# Patient Record
(demographics unavailable — no encounter records)

---

## 2024-11-20 NOTE — ASSESSMENT
[FreeTextEntry1] : Follow-up appointment in 1 year with aortic CTA.  I spent 35 minutes reviewing patient's CAT scan and with the patient.

## 2024-11-20 NOTE — HISTORY OF PRESENT ILLNESS
[FreeTextEntry1] : Patient is status post endovascular repair of the abdominal aortic aneurysm.  Recent aortic CTA showed no evidence of endoleak.  Patient denies any pain.

## 2024-11-20 NOTE — PHYSICAL EXAM
[JVD] : no jugular venous distention  [Carotid Bruits] : no carotid bruits [Normal Breath Sounds] : Normal breath sounds [Normal Heart Sounds] : normal heart sounds [2+] : left 2+ [1+] : left 1+ [Ankle Swelling (On Exam)] : not present [Varicose Veins Of Lower Extremities] : not present [] : not present [Abdomen Masses] : No abdominal masses [Abdomen Tenderness] : ~T ~M No abdominal tenderness [No HSM] : no hepatosplenomegaly [No Rash or Lesion] : No rash or lesion [Alert] : alert [Calm] : calm [de-identified] : No distress

## 2024-12-18 NOTE — PHYSICAL EXAM
[Well Developed] : well developed [Well Nourished] : well nourished [No Acute Distress] : no acute distress [Obese] : obese [Normal Conjunctiva] : normal conjunctiva [Normal Venous Pressure] : normal venous pressure [No Carotid Bruit] : no carotid bruit [Normal S1, S2] : normal S1, S2 [No Murmur] : no murmur [No Rub] : no rub [No Gallop] : no gallop [Clear Lung Fields] : clear lung fields [Good Air Entry] : good air entry [No Respiratory Distress] : no respiratory distress  [Soft] : abdomen soft [Non Tender] : non-tender [No Masses/organomegaly] : no masses/organomegaly [Normal Bowel Sounds] : normal bowel sounds [Normal Gait] : normal gait [No Edema] : no edema [No Cyanosis] : no cyanosis [No Clubbing] : no clubbing [No Varicosities] : no varicosities [No Rash] : no rash [No Skin Lesions] : no skin lesions [Moves all extremities] : moves all extremities [No Focal Deficits] : no focal deficits [Normal Speech] : normal speech [Alert and Oriented] : alert and oriented [Normal memory] : normal memory [General Appearance - In No Acute Distress] : no acute distress [Normal Oral Mucosa] : normal oral mucosa [No Oral Pallor] : no oral pallor [No Oral Cyanosis] : no oral cyanosis [Normal Jugular Venous A Waves Present] : normal jugular venous A waves present [Normal Jugular Venous V Waves Present] : normal jugular venous V waves present [No Jugular Venous Loco A Waves] : no jugular venous loco A waves [Respiration, Rhythm And Depth] : normal respiratory rhythm and effort [Exaggerated Use Of Accessory Muscles For Inspiration] : no accessory muscle use [Auscultation Breath Sounds / Voice Sounds] : lungs were clear to auscultation bilaterally [Heart Rate And Rhythm] : heart rate and rhythm were normal [Heart Sounds] : normal S1 and S2 [Murmurs] : no murmurs present [Abdomen Soft] : soft [Abdomen Tenderness] : non-tender [Abdomen Mass (___ Cm)] : no abdominal mass palpated [Abnormal Walk] : normal gait [Gait - Sufficient For Exercise Testing] : the gait was sufficient for exercise testing [Nail Clubbing] : no clubbing of the fingernails [Cyanosis, Localized] : no localized cyanosis [Petechial Hemorrhages (___cm)] : no petechial hemorrhages [Skin Color & Pigmentation] : normal skin color and pigmentation [] : no rash [No Venous Stasis] : no venous stasis [Skin Lesions] : no skin lesions [No Skin Ulcers] : no skin ulcer [No Xanthoma] : no  xanthoma was observed [Oriented To Time, Place, And Person] : oriented to person, place, and time [Affect] : the affect was normal [Mood] : the mood was normal [No Anxiety] : not feeling anxious [de-identified] : Limited exercise capacity with chronic back pain [FreeTextEntry1] : obese

## 2024-12-18 NOTE — ASSESSMENT
[FreeTextEntry1] : Gato has the medical history detailed above and active medical issues including:  - No anginal symptoms, small fixed defect without ischemia.  Continue optimized medical management for CAD.   - CAD 2vCABG with LIMA 2004 stable on ASA  - Hypertension resting home BPs at guideline goal on Metoprolol and Ramipril  - Abdominal aortic aneurysm surgery Dr. Wilson 10/26/2023 [facility].   - Dyslipidemia intolerant statins with elevated LFT elevation, myalgia and joint pain. Patient on  Zetia every other day.  Patient declined starting Repatha and enrollment in the lipid study ESC. Hyperglyceridemia on fenofibrate 48 mg daily with repeat labs 3 months.  Triglyceride remains above guideline goal reminded patient to reduce cheese and eggs in his diet.  - History of  arthritis  - Sciatica severe back pain limiting walking ability  -  Occupation   Advised patient to follow active lifestyle with regular cardiovascular exercise. Patient educated on lifestyle and diet modification with low sodium low fat diet and avoidance of excessive alcohol. Patient is aware to call with any symptoms or concerns.    Patient will be seen in cardiology follow-up 6 months with echocardiogram.  Current cardiac medications remain unchanged and renewals  are up to date. Repeat labs ordered.  Gato will follow up with Dr. Humberto Lozano for primary care.  Total time spent 45 minutes, reviewing of test results, chart information, patient discussion, physical exam and completion of chart documentation.

## 2024-12-18 NOTE — REVIEW OF SYSTEMS
[Joint Pain] : joint pain [Negative] : Heme/Lymph [FreeTextEntry9] : Spinal stenosis chronic back pain, limited exercise

## 2024-12-18 NOTE — REASON FOR VISIT
[Other: ____] : [unfilled] [Follow-Up - Clinic] : a clinic follow-up of [Hyperlipidemia] : hyperlipidemia [Hypertension] : hypertension [Peripheral Vascular Disease] : peripheral vascular disease [FreeTextEntry1] : Gato has a past medical history of hypertension, dyslipidemia, intolerance to statin therapy with LFT elevation, preDM, obesity, arthritis, erectile dysfunction, coronary artery disease, two-vessel CABG with LIMA in 2004, spinal stenosis epidural pain management, limited exercise, AAA repair Oct 2023.  Cardiovascular review of symptoms is negative for exertional chest pain, dyspnea, palpitations dizziness or syncope. No PND or orthopnea leg edema. No bleeding or black stool.    Patient is exercising in the gym 25 minutes treadmill without exertional symptoms.  Patient has right knee pain, locked up and, patient has siatica with severe back pain epidural pain management with Dr. Paniagua. Patient works as a  in the summer and spends harkins in [place].   Gato has been intolerant to statin therapy in the past with LFT elevation. Patient not a candidate for the ESC lipid study. Fasting lipid labs are borderline on Zetia. I have reminded him to avoid alcohol. He is following a low-sodium, low-fat, low carbohydrate diet.   Echocardiogram June 2023 LVEF 55 to 60%, mild MR, AS and TR  Labs June 2022 normal CBC, BMP, LFT, TSH, total cholesterol 189, triglyceride 162, HDL 51, , HbA1c 6.7  Lexiscan Myoview stress test Nov 2021 LVEF 66%, small inferolateral ischemic defect, nonischemic EKG response, no chest pain, baseline sinus rhythm, I RBBB, PRWP versus ASW MI  CT abdominal aorta 11/14/2020 stable infrarenal abdominal aortic aneurysm with peripheral mural thrombus measuring 4.4 x 4.3 cm.  Patient following up with vascular surgery Dr. Wilson  Echocardiogram July 2020, LVEF 55%, mild diastolic dysfunction, mild MR and TR, normal RVSP  Patient is following up with  for abdominal aortic aneurysm CAT scan is pending. Abdominal CTA 5/25/19,  AAA 3.8cm  Labs 9/11/19 normal hemoglobin, platelet 104, normal BMP, fasting glucose 151, ALT 63, AST 57, HbA1c 7.0.  Patient will follow up with PCP Dr Curtis for diabetes.   Labs October 2018 total cholesterol 216, triglyceride 306, HDL 46, , normal CBC, BMP, LFT 44. Suggested patient followup with endocrinologist for HbA1c 6.7, patient declined  Contrast CTA of the neck October 2017, no significant stenosis bilaterally, no abnormality seen right carotid artery, study done for linear structure seen on carotid Doppler.  Carotid Doppler October 2017, nonobstructive mild heterogeneous plaque, possible linear structure RCCA  Abdominal ultrasound October 2017, mid abdomen aorta 3.3cm, prior study 3.2cm  2-D echo October 2017, LVEF 55% mild diastolic dysfunction, mild MR TR, PSP 33 mmHg  Exercise Myoview stress test July 2017, LVEF 64% small thick basal inferolateral defect with mild hypokinesis basal inferolateral, critical EKG response, no anginal symptoms, 89% MPHR, 6 minutes 30 seconds David protocol  EKG  5/28/15, SR, IRBBB, NSST  2-D echo October 2016 LVEF 55-60%, aortic root 4.2 cm, mild diastolic dysfunction, mild MR  Carotid ultrasound October 2016 mild nonobstructive plaque  Abdominal ultrasound October 2060 distal aorta 3.2 cm. Followup Dr. Wilson with CTA abdomen  [FreeTextEntry2] : nonionvasive testing for CAD,  2vCABG LIMA 2004, HTN, dyslipidemia intolerant to statins, preDM, obesity

## 2024-12-18 NOTE — PHYSICAL EXAM
[Well Developed] : well developed [Well Nourished] : well nourished [No Acute Distress] : no acute distress [Obese] : obese [Normal Conjunctiva] : normal conjunctiva [Normal Venous Pressure] : normal venous pressure [No Carotid Bruit] : no carotid bruit [Normal S1, S2] : normal S1, S2 [No Murmur] : no murmur [No Rub] : no rub [No Gallop] : no gallop [Clear Lung Fields] : clear lung fields [Good Air Entry] : good air entry [No Respiratory Distress] : no respiratory distress  [Soft] : abdomen soft [Non Tender] : non-tender [No Masses/organomegaly] : no masses/organomegaly [Normal Bowel Sounds] : normal bowel sounds [Normal Gait] : normal gait [No Edema] : no edema [No Cyanosis] : no cyanosis [No Clubbing] : no clubbing [No Varicosities] : no varicosities [No Rash] : no rash [No Skin Lesions] : no skin lesions [Moves all extremities] : moves all extremities [No Focal Deficits] : no focal deficits [Normal Speech] : normal speech [Alert and Oriented] : alert and oriented [Normal memory] : normal memory [General Appearance - In No Acute Distress] : no acute distress [Normal Oral Mucosa] : normal oral mucosa [No Oral Pallor] : no oral pallor [No Oral Cyanosis] : no oral cyanosis [Normal Jugular Venous A Waves Present] : normal jugular venous A waves present [Normal Jugular Venous V Waves Present] : normal jugular venous V waves present [No Jugular Venous Loco A Waves] : no jugular venous loco A waves [Respiration, Rhythm And Depth] : normal respiratory rhythm and effort [Exaggerated Use Of Accessory Muscles For Inspiration] : no accessory muscle use [Auscultation Breath Sounds / Voice Sounds] : lungs were clear to auscultation bilaterally [Heart Rate And Rhythm] : heart rate and rhythm were normal [Heart Sounds] : normal S1 and S2 [Murmurs] : no murmurs present [Abdomen Soft] : soft [Abdomen Tenderness] : non-tender [Abdomen Mass (___ Cm)] : no abdominal mass palpated [Abnormal Walk] : normal gait [Gait - Sufficient For Exercise Testing] : the gait was sufficient for exercise testing [Nail Clubbing] : no clubbing of the fingernails [Cyanosis, Localized] : no localized cyanosis [Petechial Hemorrhages (___cm)] : no petechial hemorrhages [Skin Color & Pigmentation] : normal skin color and pigmentation [] : no rash [No Venous Stasis] : no venous stasis [Skin Lesions] : no skin lesions [No Skin Ulcers] : no skin ulcer [No Xanthoma] : no  xanthoma was observed [Oriented To Time, Place, And Person] : oriented to person, place, and time [Affect] : the affect was normal [Mood] : the mood was normal [No Anxiety] : not feeling anxious [de-identified] : Limited exercise capacity with chronic back pain [FreeTextEntry1] : obese

## 2025-05-28 NOTE — ASSESSMENT
[FreeTextEntry1] : Gato has the medical history detailed above and active medical issues including:  - No anginal symptoms, small fixed defect without ischemia.  Continue optimized medical management for CAD.   - CAD 2vCABG with LIMA 2004 stable on ASA  - Hypertension resting home BPs at guideline goal on Metoprolol and Ramipril  - Abdominal aortic aneurysm surgery Dr. Wilson 10/26/2023 [facility].   - Dyslipidemia intolerant statins with elevated LFT elevation, myalgia and joint pain. Patient on  Zetia every other day.  Patient declined starting Repatha and enrollment in the lipid study ESC. Hyperglyceridemia on fenofibrate 48 mg daily with repeat labs 3 months.  Triglyceride remains above guideline goal reminded patient to reduce cheese and eggs in his diet.  - History of  arthritis  - Sciatica severe back pain limiting walking ability  -  Occupation   Advised patient to follow active lifestyle with regular cardiovascular exercise. Patient educated on lifestyle and diet modification with low sodium low fat diet and avoidance of excessive alcohol. Patient is aware to call with any symptoms or concerns.    Patient will be seen in cardiology follow-up 6 months.  Current cardiac medications remain unchanged and renewals  are up to date. Repeat labs ordered.  Gato will follow up with Dr. Humberto Lozano for primary care.  Total time spent 45 minutes, reviewing of test results, chart information, patient discussion, physical exam and completion of chart documentation.

## 2025-05-28 NOTE — PHYSICAL EXAM
[Well Developed] : well developed [Well Nourished] : well nourished [No Acute Distress] : no acute distress [Obese] : obese [Normal Conjunctiva] : normal conjunctiva [Normal Venous Pressure] : normal venous pressure [No Carotid Bruit] : no carotid bruit [Normal S1, S2] : normal S1, S2 [No Murmur] : no murmur [No Rub] : no rub [No Gallop] : no gallop [Clear Lung Fields] : clear lung fields [Good Air Entry] : good air entry [No Respiratory Distress] : no respiratory distress  [Soft] : abdomen soft [Non Tender] : non-tender [No Masses/organomegaly] : no masses/organomegaly [Normal Bowel Sounds] : normal bowel sounds [Normal Gait] : normal gait [No Edema] : no edema [No Cyanosis] : no cyanosis [No Clubbing] : no clubbing [No Varicosities] : no varicosities [No Rash] : no rash [No Skin Lesions] : no skin lesions [Moves all extremities] : moves all extremities [No Focal Deficits] : no focal deficits [Normal Speech] : normal speech [Alert and Oriented] : alert and oriented [Normal memory] : normal memory [General Appearance - In No Acute Distress] : no acute distress [Normal Oral Mucosa] : normal oral mucosa [No Oral Pallor] : no oral pallor [No Oral Cyanosis] : no oral cyanosis [Normal Jugular Venous A Waves Present] : normal jugular venous A waves present [Normal Jugular Venous V Waves Present] : normal jugular venous V waves present [No Jugular Venous Loco A Waves] : no jugular venous loco A waves [Respiration, Rhythm And Depth] : normal respiratory rhythm and effort [Exaggerated Use Of Accessory Muscles For Inspiration] : no accessory muscle use [Auscultation Breath Sounds / Voice Sounds] : lungs were clear to auscultation bilaterally [Heart Rate And Rhythm] : heart rate and rhythm were normal [Heart Sounds] : normal S1 and S2 [Murmurs] : no murmurs present [Abdomen Soft] : soft [Abdomen Tenderness] : non-tender [Abdomen Mass (___ Cm)] : no abdominal mass palpated [Abnormal Walk] : normal gait [Gait - Sufficient For Exercise Testing] : the gait was sufficient for exercise testing [Nail Clubbing] : no clubbing of the fingernails [Cyanosis, Localized] : no localized cyanosis [Petechial Hemorrhages (___cm)] : no petechial hemorrhages [Skin Color & Pigmentation] : normal skin color and pigmentation [] : no rash [No Venous Stasis] : no venous stasis [Skin Lesions] : no skin lesions [No Skin Ulcers] : no skin ulcer [No Xanthoma] : no  xanthoma was observed [Oriented To Time, Place, And Person] : oriented to person, place, and time [Affect] : the affect was normal [Mood] : the mood was normal [No Anxiety] : not feeling anxious [de-identified] : Limited exercise capacity with chronic back pain [FreeTextEntry1] : obese

## 2025-05-28 NOTE — PHYSICAL EXAM
[Well Developed] : well developed [Well Nourished] : well nourished [No Acute Distress] : no acute distress [Obese] : obese [Normal Conjunctiva] : normal conjunctiva [Normal Venous Pressure] : normal venous pressure [No Carotid Bruit] : no carotid bruit [Normal S1, S2] : normal S1, S2 [No Murmur] : no murmur [No Rub] : no rub [No Gallop] : no gallop [Clear Lung Fields] : clear lung fields [Good Air Entry] : good air entry [No Respiratory Distress] : no respiratory distress  [Soft] : abdomen soft [Non Tender] : non-tender [No Masses/organomegaly] : no masses/organomegaly [Normal Bowel Sounds] : normal bowel sounds [Normal Gait] : normal gait [No Edema] : no edema [No Cyanosis] : no cyanosis [No Clubbing] : no clubbing [No Varicosities] : no varicosities [No Rash] : no rash [No Skin Lesions] : no skin lesions [Moves all extremities] : moves all extremities [No Focal Deficits] : no focal deficits [Normal Speech] : normal speech [Alert and Oriented] : alert and oriented [Normal memory] : normal memory [General Appearance - In No Acute Distress] : no acute distress [Normal Oral Mucosa] : normal oral mucosa [No Oral Pallor] : no oral pallor [No Oral Cyanosis] : no oral cyanosis [Normal Jugular Venous A Waves Present] : normal jugular venous A waves present [Normal Jugular Venous V Waves Present] : normal jugular venous V waves present [No Jugular Venous Loco A Waves] : no jugular venous loco A waves [Respiration, Rhythm And Depth] : normal respiratory rhythm and effort [Exaggerated Use Of Accessory Muscles For Inspiration] : no accessory muscle use [Auscultation Breath Sounds / Voice Sounds] : lungs were clear to auscultation bilaterally [Heart Rate And Rhythm] : heart rate and rhythm were normal [Heart Sounds] : normal S1 and S2 [Murmurs] : no murmurs present [Abdomen Soft] : soft [Abdomen Tenderness] : non-tender [Abdomen Mass (___ Cm)] : no abdominal mass palpated [Abnormal Walk] : normal gait [Gait - Sufficient For Exercise Testing] : the gait was sufficient for exercise testing [Nail Clubbing] : no clubbing of the fingernails [Cyanosis, Localized] : no localized cyanosis [Petechial Hemorrhages (___cm)] : no petechial hemorrhages [Skin Color & Pigmentation] : normal skin color and pigmentation [] : no rash [No Venous Stasis] : no venous stasis [Skin Lesions] : no skin lesions [No Skin Ulcers] : no skin ulcer [No Xanthoma] : no  xanthoma was observed [Oriented To Time, Place, And Person] : oriented to person, place, and time [Affect] : the affect was normal [Mood] : the mood was normal [No Anxiety] : not feeling anxious [de-identified] : Limited exercise capacity with chronic back pain [FreeTextEntry1] : obese

## 2025-05-28 NOTE — REASON FOR VISIT
[Other: ____] : [unfilled] [Follow-Up - Clinic] : a clinic follow-up of [Hyperlipidemia] : hyperlipidemia [Hypertension] : hypertension [Peripheral Vascular Disease] : peripheral vascular disease [FreeTextEntry1] : Gato has a past medical history of hypertension, dyslipidemia, intolerance to statin therapy with LFT elevation, preDM, obesity, arthritis, erectile dysfunction, coronary artery disease, two-vessel CABG with LIMA in 2004, spinal stenosis epidural pain management, limited exercise, AAA repair Oct 2023.  Cardiovascular review of symptoms is negative for exertional chest pain, dyspnea, palpitations dizziness or syncope. No PND or orthopnea leg edema. No bleeding or black stool.    Patient is exercising in the gym 25 minutes treadmill without exertional symptoms.  Patient has right knee pain, locked up and, patient has siatica with severe back pain epidural pain management with Dr. Paniagua. Patient works as a  in the summer and spends harkins in [place].   Gato has been intolerant to statin therapy in the past with LFT elevation. Patient not a candidate for the ESC lipid study. Fasting lipid labs are borderline on Zetia. I have reminded him to avoid alcohol. He is following a low-sodium, low-fat, low carbohydrate diet  Echocardiogram May 2025 LVEF 55 to 60%, mild-moderate MR, mild TR, normal PASP..   Echocardiogram June 2023 LVEF 55 to 60%, mild MR, AS and TR  Labs June 2022 normal CBC, BMP, LFT, TSH, total cholesterol 189, triglyceride 162, HDL 51, , HbA1c 6.7  Lexiscan Myoview stress test Nov 2021 LVEF 66%, small inferolateral ischemic defect, nonischemic EKG response, no chest pain, baseline sinus rhythm, I RBBB, PRWP versus ASW MI  CT abdominal aorta 11/14/2020 stable infrarenal abdominal aortic aneurysm with peripheral mural thrombus measuring 4.4 x 4.3 cm.  Patient following up with vascular surgery Dr. Wilson  Echocardiogram July 2020, LVEF 55%, mild diastolic dysfunction, mild MR and TR, normal RVSP  Patient is following up with  for abdominal aortic aneurysm CAT scan is pending. Abdominal CTA 5/25/19,  AAA 3.8cm  Labs 9/11/19 normal hemoglobin, platelet 104, normal BMP, fasting glucose 151, ALT 63, AST 57, HbA1c 7.0.  Patient will follow up with PCP Dr Curtis for diabetes.   Labs October 2018 total cholesterol 216, triglyceride 306, HDL 46, , normal CBC, BMP, LFT 44. Suggested patient followup with endocrinologist for HbA1c 6.7, patient declined  Contrast CTA of the neck October 2017, no significant stenosis bilaterally, no abnormality seen right carotid artery, study done for linear structure seen on carotid Doppler.  Carotid Doppler October 2017, nonobstructive mild heterogeneous plaque, possible linear structure RCCA  Abdominal ultrasound October 2017, mid abdomen aorta 3.3cm, prior study 3.2cm  2-D echo October 2017, LVEF 55% mild diastolic dysfunction, mild MR TR, PSP 33 mmHg  Exercise Myoview stress test July 2017, LVEF 64% small thick basal inferolateral defect with mild hypokinesis basal inferolateral, critical EKG response, no anginal symptoms, 89% MPHR, 6 minutes 30 seconds David protocol  EKG  5/28/15, SR, IRBBB, NSST  2-D echo October 2016 LVEF 55-60%, aortic root 4.2 cm, mild diastolic dysfunction, mild MR  Carotid ultrasound October 2016 mild nonobstructive plaque  Abdominal ultrasound October 2060 distal aorta 3.2 cm. Followup Dr. Wilson with CTA abdomen  [FreeTextEntry2] : nonionvasive testing for CAD,  2vCABG LIMA 2004, HTN, dyslipidemia intolerant to statins, preDM, obesity